# Patient Record
Sex: FEMALE | Race: WHITE | URBAN - NONMETROPOLITAN AREA
[De-identification: names, ages, dates, MRNs, and addresses within clinical notes are randomized per-mention and may not be internally consistent; named-entity substitution may affect disease eponyms.]

---
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
## 2022-10-05 NOTE — FLOWSHEET NOTE
Hpi Title: Evaluation of Skin Lesions HEP   Doorway Stretch 20\" 3 x Initiated 9/7/18 HEP   Rail stretch 20\" 3 x Initiated 9/7/18 HEP   Arch/sag 5\" 10 x each Initiated 9/7/18 HEP   Hip ext on all 4's 15 x each Progressed 9/19/18 HEP   Bird -dog 15 x each Initiated 9/14/18 HEP         Wall Matrix 2# 5 x each 4 positions Progressed 9/19/18        Tball prone shoulder [place]ons 10 x each Progressed 9/19/18   T-ball prone walkout 5 x Initiated 9/19/18   T-ball prone (hands on floor) rocking: Fwd back, side to side and circles both ways 10 x each Initiated 9/19/18             [x] Provided verbal/tactile cueing for activities related to strengthening, flexibility, endurance, ROM. (24946)  [] Provided verbal/tactile cueing for activities related to improving balance, coordination, kinesthetic sense, posture, motor skill, proprioception. (79819)    Therapeutic Activities:     [] Therapeutic activities, direct (one-on-one) patient contact (use of dynamic activities to improve functional performance). (90270)    Gait:   [] Provided training and instruction to the patient for ambulation re-education. (79742)    Self-Care/ADL's  [] Self-care/home management training and compensatory training, meal preparation, safety procedures, and instructions in use of assistive technology devices/adaptive equipment, direct one-on-one contact. (85559)    Home Exercise Program:   HEP per log. [x] Reviewed/Progressed HEP activities related to strengthening, flexibility, endurance, ROM. (84589)  [] Reviewed/Progressed HEP activities related to improving balance, coordination, kinesthetic sense, posture, motor skill, proprioception.  (78814)    Manual Treatments:    [] Provided manual therapy to mobilize soft tissue/joints for the purpose of modulating pain, promoting relaxation,  increasing ROM, reducing/eliminating soft tissue swelling/inflammation/restriction, improving soft tissue extensibility. (66126)    Service Based Modalities:      Timed Code Treatment Minutes:    There How Severe Are Your Spot(S)?: mild Have Your Spot(S) Been Treated In The Past?: has not been treated